# Patient Record
Sex: FEMALE
[De-identification: names, ages, dates, MRNs, and addresses within clinical notes are randomized per-mention and may not be internally consistent; named-entity substitution may affect disease eponyms.]

---

## 2024-01-04 ENCOUNTER — APPOINTMENT (OUTPATIENT)
Dept: VASCULAR SURGERY | Facility: CLINIC | Age: 79
End: 2024-01-04
Payer: COMMERCIAL

## 2024-01-04 DIAGNOSIS — I73.9 PERIPHERAL VASCULAR DISEASE, UNSPECIFIED: ICD-10-CM

## 2024-01-04 DIAGNOSIS — R60.0 LOCALIZED EDEMA: ICD-10-CM

## 2024-01-04 DIAGNOSIS — I83.93 ASYMPTOMATIC VARICOSE VEINS OF BILATERAL LOWER EXTREMITIES: ICD-10-CM

## 2024-01-04 DIAGNOSIS — Z78.9 OTHER SPECIFIED HEALTH STATUS: ICD-10-CM

## 2024-01-04 PROBLEM — Z00.00 ENCOUNTER FOR PREVENTIVE HEALTH EXAMINATION: Status: ACTIVE | Noted: 2024-01-04

## 2024-01-04 PROCEDURE — 99202 OFFICE O/P NEW SF 15 MIN: CPT | Mod: NC

## 2024-01-04 RX ORDER — TIOCONAZOLE 6.5 %
400 OINTMENT WITH PREFILLED APPLICATOR VAGINAL
Refills: 0 | Status: ACTIVE | COMMUNITY

## 2024-01-04 RX ORDER — PNV NO.95/FERROUS FUM/FOLIC AC 28MG-0.8MG
TABLET ORAL
Refills: 0 | Status: ACTIVE | COMMUNITY

## 2024-01-04 RX ORDER — MAGNESIUM OXIDE 400 MG/1
400 TABLET ORAL
Refills: 0 | Status: ACTIVE | COMMUNITY

## 2024-02-08 NOTE — HISTORY OF PRESENT ILLNESS
[FreeTextEntry1] : 78yoF w/PMHx of chronic varicose veins not requiring intervention, DJD/OA requiring R TKR, no smoking hx, referred by Dr. César Galeano for evaluation of her intermittent R ankle swelling and for evaluation of suspected PAD.  Pt states that she has experienced R ankle swelling for years-more often during the summer; swelling does not limit her activity and always resolves w/rest/elevation, especially when resting overnight.  She denies any h/o DVT/SVT and denies any leg swelling currently.  At times, the R ankle does feel stiff and inflamed.  Pt was also screened for PAD w/duplex and was noted to have L PTA stenosis and R dSFA/DPA stenosis but no reported symptoms, follow-up CTA aorta w/runoff to feet performed which did not demonstrate any significant vascular disease.  Pt reports no symptoms of calf claudication rest pain in lg feet/toes, and states that although she has not been walking as often now, she is not limited in her walking distance when required to ambulate.

## 2024-02-08 NOTE — DATA REVIEWED
[FreeTextEntry1] : LE arterial duplex studies and CTA aorta w/runoff report reviewed and in [employer]

## 2024-02-08 NOTE — REASON FOR VISIT
[Consultation] : a consultation visit [Spouse] : spouse [FreeTextEntry1] : R foot swelling, intermittent, and suspicion of PAD

## 2024-02-08 NOTE — ASSESSMENT
[FreeTextEntry1] : 78yoF w/PMHx of chronic varicose veins not requiring intervention, DJD/OA requiring R TKR, no smoking hx, referred by Dr. César Galeano for evaluation of her intermittent R ankle swelling and for evaluation of suspected peripheral arterial disease (PAD).  Pt states that she has experienced R ankle swelling for years-more often during the summer; swelling does not limit her activity and always resolves w/rest/elevation, especially when resting overnight.  She denies any h/o DVT/SVT and denies any leg swelling currently.  At times, the R ankle does feel stiff and inflamed.  Pt was also screened for PAD w/duplex and was noted to have L PTA stenosis and R dSFA/DPA stenosis but no reported symptoms, follow-up CTA aorta w/runoff to feet performed which did not demonstrate any significant vascular disease.  Pt reports no symptoms of calf claudication rest pain in lg feet/toes, and states that although she has not been walking as often now, she is not limited in her walking distance when required to ambulate.  Pt does not demonstrate any clinical evidence of PAD on exam, although duplex studies do suggest mild disease in both legs, unconfirmed by CTA.  Recommend pt continue to ambulate regularly to promote adequate peripheral circulation but reassured her that no vascular intervention is indicated at this time.  As she does report intermittent swelling in the R ankle likely due to chronic venous insufficiency, would recommend only conservative management w/compression stockings daily, and massage therapy and elevation episodically for flare-ups.  If her symptoms worsen, we can reassess in the future.  20 minutes spent with patient